# Patient Record
Sex: MALE | Race: OTHER | URBAN - METROPOLITAN AREA
[De-identification: names, ages, dates, MRNs, and addresses within clinical notes are randomized per-mention and may not be internally consistent; named-entity substitution may affect disease eponyms.]

---

## 2017-08-01 ENCOUNTER — OFFICE VISIT (OUTPATIENT)
Dept: FAMILY MEDICINE CLINIC | Age: 32
End: 2017-08-01

## 2017-08-01 ENCOUNTER — HOSPITAL ENCOUNTER (OUTPATIENT)
Dept: LAB | Age: 32
Discharge: HOME | End: 2017-08-01

## 2017-08-01 VITALS
SYSTOLIC BLOOD PRESSURE: 146 MMHG | DIASTOLIC BLOOD PRESSURE: 86 MMHG | TEMPERATURE: 99.1 F | WEIGHT: 203 LBS | BODY MASS INDEX: 31.86 KG/M2 | HEART RATE: 94 BPM | HEIGHT: 67 IN

## 2017-08-01 DIAGNOSIS — G89.29 CHRONIC MIDLINE LOW BACK PAIN WITHOUT SCIATICA: ICD-10-CM

## 2017-08-01 DIAGNOSIS — R35.0 URINE FREQUENCY: Primary | ICD-10-CM

## 2017-08-01 DIAGNOSIS — M54.50 CHRONIC MIDLINE LOW BACK PAIN WITHOUT SCIATICA: ICD-10-CM

## 2017-08-01 DIAGNOSIS — R35.0 URINE FREQUENCY: ICD-10-CM

## 2017-08-01 LAB
ALBUMIN SERPL BCP-MCNC: 4.8 G/DL (ref 3.5–5)
ALBUMIN/GLOB SERPL: 1.5 {RATIO} (ref 1.1–2.2)
ALP SERPL-CCNC: 93 U/L (ref 45–117)
ALT SERPL-CCNC: 49 U/L (ref 12–78)
ANION GAP BLD CALC-SCNC: 5 MMOL/L (ref 5–15)
AST SERPL W P-5'-P-CCNC: 27 U/L (ref 15–37)
BILIRUB SERPL-MCNC: 0.5 MG/DL (ref 0.2–1)
BILIRUB UR QL STRIP: NEGATIVE
BUN SERPL-MCNC: 13 MG/DL (ref 6–20)
BUN/CREAT SERPL: 11 (ref 12–20)
CALCIUM SERPL-MCNC: 8.9 MG/DL (ref 8.5–10.1)
CHLORIDE SERPL-SCNC: 104 MMOL/L (ref 97–108)
CO2 SERPL-SCNC: 29 MMOL/L (ref 21–32)
CREAT SERPL-MCNC: 1.19 MG/DL (ref 0.7–1.3)
GLOBULIN SER CALC-MCNC: 3.3 G/DL (ref 2–4)
GLUCOSE SERPL-MCNC: 119 MG/DL (ref 65–100)
GLUCOSE UR-MCNC: NEGATIVE MG/DL
KETONES P FAST UR STRIP-MCNC: NEGATIVE MG/DL
PH UR STRIP: 6 [PH] (ref 4.6–8)
POTASSIUM SERPL-SCNC: 3.6 MMOL/L (ref 3.5–5.1)
PROT SERPL-MCNC: 8.1 G/DL (ref 6.4–8.2)
PROT UR QL STRIP: NEGATIVE MG/DL
SODIUM SERPL-SCNC: 138 MMOL/L (ref 136–145)
SP GR UR STRIP: 1.01 (ref 1–1.03)
UA UROBILINOGEN AMB POC: NORMAL (ref 0.2–1)
URINALYSIS CLARITY POC: CLEAR
URINALYSIS COLOR POC: YELLOW
URINE BLOOD POC: NORMAL
URINE LEUKOCYTES POC: NEGATIVE
URINE NITRITES POC: NEGATIVE

## 2017-08-01 PROCEDURE — 80053 COMPREHEN METABOLIC PANEL: CPT | Performed by: NURSE PRACTITIONER

## 2017-08-01 PROCEDURE — 87491 CHLMYD TRACH DNA AMP PROBE: CPT | Performed by: NURSE PRACTITIONER

## 2017-08-01 PROCEDURE — 87086 URINE CULTURE/COLONY COUNT: CPT | Performed by: NURSE PRACTITIONER

## 2017-08-01 RX ORDER — NAPROXEN 500 MG/1
500 TABLET ORAL 2 TIMES DAILY WITH MEALS
Qty: 40 TAB | Refills: 1 | Status: SHIPPED | OUTPATIENT
Start: 2017-08-01

## 2017-08-01 NOTE — PROGRESS NOTES
Subjective:     Chief Complaint   Patient presents with   [address]        He  is a 33 y.o. male who presents for evaluation of LBP x 1-2 months ago. No Hx of fall/trauma to back. Pain is continous. Pt also notes some urinary frequency, it worse at night where he notes he has to go q15-30 min x 3 times then stops. Pt has also noted some itching s/p urination. No penile discharge. No change w/ sexual partners in last 1-2 years. PMH: Herpes labialis    Surg: wisdom tooth removal.     NKDA     No current Rx    Pt works as a  x 10 years. Pt denies etoh, tobacco nor drug use. Pt is single but with a long time partner. Family Hx: mother, colon CA. ROS  Gen - no fever/chills  Resp - no dyspnea or cough  CV - no chest pain or LINDA  Rest per HPI    No past medical history on file. No past surgical history on file. No current outpatient prescriptions on file prior to visit. No current facility-administered medications on file prior to visit. Objective:     Vitals:    08/01/17 1353   BP: 146/86   Pulse: 94   Temp: 99.1 °F (37.3 °C)   TempSrc: Oral   Weight: 203 lb (92.1 kg)   Height: 5' 7.44\" (1.713 m)       Physical Examination:  General appearance - alert, well appearing, and in no distress  Eyes -sclera anicteric  Neck - supple, no significant adenopathy, no thyromegaly  Chest - clear to auscultation, no wheezes, rales or rhonchi, symmetric air entry  Heart - normal rate, regular rhythm, normal S1, S2, no murmurs, rubs, clicks or gallops  Neurological - alert, oriented, no focal findings or movement disorder noted  Abdomen-BS present/WNL x 4 quads, non-tender/distended, soft,no organomegaly  Penis-no discharge/skin changes, uncircumcised, no scrotal edema nor testicular masses, no hernias noted.       Recent Results (from the past 12 hour(s))   AMB POC URINALYSIS DIP STICK MANUAL W/O MICRO    Collection Time: 08/01/17  2:19 PM   Result Value Ref Range    Color (UA POC) Yellow     Clarity (UA POC) Clear     Glucose (UA POC) Negative Negative    Bilirubin (UA POC) Negative Negative    Ketones (UA POC) Negative Negative    Specific gravity (UA POC) 1.010 1.001 - 1.035    Blood (UA POC) 3+ Negative    pH (UA POC) 6 4.6 - 8.0    Protein (UA POC) Negative Negative mg/dL    Urobilinogen (UA POC) 0.2 mg/dL 0.2 - 1    Nitrites (UA POC) Negative Negative    Leukocyte esterase (UA POC) Negative Negative         Assessment/ Plan:   Diagnoses and all orders for this visit:    1. Urine frequency  -     AMB POC URINALYSIS DIP STICK MANUAL W/O MICRO  -     CULTURE, URINE; Future  -     CHLAMYDIA/GC PCR; Future  -     METABOLIC PANEL, COMPREHENSIVE; Future    2. Chronic midline low back pain without sciatica  -     naproxen (NAPROSYN) 500 mg tablet; Take 1 Tab by mouth two (2) times daily (with meals). Noted hematuria on POC UA. LBP is midline and suspected M/S vs renal caluli or UTI. Send off urine Cx and g/c. Check renal function today. PRN Naproxen for back pain.  exam unremarkable. At end of visit, Pt also noted c/o of rectal inflammation and poor response from topical steroids. Recommend OTC prep H suppositories and would eval further at f/u appt. Change POC if labs abnormal.     I have discussed the diagnosis with the patient and the intended plan as seen in the above orders. The patient has received an after-visit summary and questions were answered concerning future plans. I have discussed medication side effects and warnings with the patient as well. The patient verbalizes understanding and agreement with the plan. Follow-up Disposition:  Return in about 6 weeks (around 9/12/2017).

## 2017-08-01 NOTE — PROGRESS NOTES
Results for orders placed or performed in visit on 08/01/17   AMB POC URINALYSIS DIP STICK MANUAL W/O MICRO   Result Value Ref Range    Color (UA POC) Yellow     Clarity (UA POC) Clear     Glucose (UA POC) Negative Negative    Bilirubin (UA POC) Negative Negative    Ketones (UA POC) Negative Negative    Specific gravity (UA POC) 1.010 1.001 - 1.035    Blood (UA POC) 3+ Negative    pH (UA POC) 6 4.6 - 8.0    Protein (UA POC) Negative Negative mg/dL    Urobilinogen (UA POC) 0.2 mg/dL 0.2 - 1    Nitrites (UA POC) Negative Negative    Leukocyte esterase (UA POC) Negative Negative

## 2017-08-01 NOTE — PROGRESS NOTES
Avs discussed with Rosalinda Ramos by Discharge Nurse Thiaog Puga LPN with Mckenna Patel. Dicussed medication prescribed today.    AVS printed and given to patient Thiago Puga LPN

## 2017-08-01 NOTE — PATIENT INSTRUCTIONS
Micción frecuente: Instrucciones de cuidado - [ Frequent Urination: Care Instructions ]  Instrucciones de cuidado  Un síntoma común de problemas urinarios, tales capo infecciones de las vías urinarias, es tener a menudo ganas de orinar molly por lo general eliminar poca orina. La vejiga puede inflamarse. [place] puede causar ganas de orinar. Es posible que usted trate de orinar con más frecuencia de lo habitual para tratar de calmar danny impulso. La micción frecuente (orinar con frecuencia) también puede ser causada por infecciones de transmisión sexual (STI, por олег siglas en inglés) o cálculos renales. O puede ocurrir cuando algo irrita el conducto que transporta la orina de la vejiga al exterior del cuerpo (uretra). También puede ser tim señal de diabetes. Puede ser difícil encontrar la causa. Es posible que usted deba hacerse pruebas. La atención de seguimiento es tim parte clave de leyva tratamiento y seguridad. Asegúrese de hacer y acudir a todas las citas, y llame a leyva médico si está teniendo problemas. También es tim buena idea saber los resultados de олег exámenes y mantener tim lista de los medicamentos que niko. ¿Cómo puede cuidarse en el hogar? · [employer] agua yin los siguientes bhavik o [address] [place] ayudará a que la orina sea menos concentrada. (Si tiene enfermedad de los riñones, el corazón o el hígado y tiene que [place] líquidos, hable con leyva médico antes de aumentar la cantidad de líquidos que gillian). · Evite las bebidas gaseosas o con cafeína. Pueden irritar la vejiga. Para las mujeres:  · Orine inmediatamente después de maranda tenido relaciones sexuales. · Después de ir al baño, límpiese de adelante hacia atrás. · Evite el uso de duchas vaginales, los charly de burbujas y los [address] de higiene femenina. Y evite otros productos para la higiene femenina que contengan desodorantes. ¿Cuándo debe pedir ayuda?   Llame a leyva médico ahora mismo o busque atención médica inmediata si:  · Tiene síntomas nuevos capo fiebre, náuseas o vómito. · Tiene síntomas nuevos o peores de un problema urinario. Por ejemplo:  ¨ Tiene west o pus en la orina. ¨ Tiene escalofríos o le duele el cuerpo. ¨ Siente dolor al [place]. ¨ Tiene dolor en la phil o el abdomen. ¨ Tiene dolor de espalda jason debajo de la caja torácica (el flanco). Vigile muy de cerca los cambios en leyva dixie, y asegúrese de comunicarse con leyva médico si siente más sed de lo habitual.  ¿Dónde puede encontrar más información en inglés? Damaris leal [email or web address] Jairo [facility] Q964 en la búsqueda para aprender más acerca de \"Micción frecuente: Instrucciones de cuidado - [ Frequent Urination: Care Instructions ]. \"  Revisado: 5 [place], 2017  Versión del contenido: 11.3  © 6231-8259 Healthwise, Incorporated. Las instrucciones de cuidado fueron adaptadas bajo licencia por [employer] (which disclaims liability or warranty for this information). Si usted tiene [place] afección médica o sobre estas instrucciones, siempre pregunte a leyva profesional de dixie. Healthwise, Incorporated niega toda garantía o responsabilidad por leyva uso de esta información.

## 2017-08-02 LAB
C TRACH DNA SPEC QL NAA+PROBE: NEGATIVE
N GONORRHOEA DNA SPEC QL NAA+PROBE: NEGATIVE
SAMPLE TYPE: NORMAL
SERVICE CMNT-IMP: NORMAL
SPECIMEN SOURCE: NORMAL

## 2017-08-03 LAB
BACTERIA SPEC CULT: NORMAL
CC UR VC: NORMAL
SERVICE CMNT-IMP: NORMAL

## 2017-09-06 ENCOUNTER — OFFICE VISIT (OUTPATIENT)
Dept: FAMILY MEDICINE CLINIC | Age: 32
End: 2017-09-06

## 2017-09-06 VITALS
BODY MASS INDEX: 31.14 KG/M2 | HEIGHT: 67 IN | SYSTOLIC BLOOD PRESSURE: 129 MMHG | TEMPERATURE: 98.1 F | WEIGHT: 198.4 LBS | HEART RATE: 78 BPM | DIASTOLIC BLOOD PRESSURE: 76 MMHG

## 2017-09-06 DIAGNOSIS — K64.0 FIRST DEGREE HEMORRHOIDS: Primary | ICD-10-CM

## 2017-09-06 RX ORDER — TRIAMCINOLONE ACETONIDE 1 MG/G
CREAM TOPICAL 2 TIMES DAILY
Qty: 80 G | Refills: 1 | Status: SHIPPED | OUTPATIENT
Start: 2017-09-06

## 2017-09-06 NOTE — PROGRESS NOTES
Subjective:     Chief Complaint   Patient presents with    Urinary Frequency     f/u        He  is a 33 y.o. male who presents for evaluation of LBP/hemorrhoids. Since LOV, Pt notes his pain and urinary frequency have resolved. No concerns r/t bowel function. Regular 1-2x day. Has tried to increase fiber. Reports he will sporadically have itching in his rectum, 2-3x month. Reports taking OTC supplement from his home country which have improved his S&S. No systemic symptoms nor crow bleeding from his rectum. ROS  Gen - no fever/chills  Resp - no dyspnea or cough  CV - no chest pain or LINDA  Rest per HPI    No past medical history on file. No past surgical history on file. Current Outpatient Prescriptions on File Prior to Visit   Medication Sig Dispense Refill    naproxen (NAPROSYN) 500 mg tablet Take 1 Tab by mouth two (2) times daily (with meals). 40 Tab 1     No current facility-administered medications on file prior to visit. Objective:     Vitals:    09/06/17 1329   BP: 129/76   Pulse: 78   Temp: 98.1 °F (36.7 °C)   TempSrc: Oral   Weight: 198 lb 6.4 oz (90 kg)   Height: 5' 7.44\" (1.713 m)       Physical Examination:  General appearance - alert, well appearing, and in no distress  Eyes -sclera anicteric  Neck - supple, no significant adenopathy, no thyromegaly  Chest - clear to auscultation, no wheezes, rales or rhonchi, symmetric air entry  Heart - normal rate, regular rhythm, normal S1, S2, no murmurs, rubs, clicks or gallops  Neurological - alert, oriented, no focal findings or movement disorder noted  Abdomen-BS present/WNL x 4 quads, non-tender/distended, soft,no organomegaly    Small, 1 degree hemrrhoid palpated between 7-8:00 position in mid rectum. Assessment/ Plan:   Diagnoses and all orders for this visit:    1. First degree hemorrhoids  -     triamcinolone acetonide (KENALOG) 0.1 % topical cream; Apply  to affected area two (2) times a day.  use thin layer      Counseled Pt on taking sitz baths, diet changes, PRN and attempting relief with topical steroid cream.     RTC PRN if no improvement after 4-6 weeks. I have discussed the diagnosis with the patient and the intended plan as seen in the above orders. The patient has received an after-visit summary and questions were answered concerning future plans. I have discussed medication side effects and warnings with the patient as well. The patient verbalizes understanding and agreement with the plan. Follow-up Disposition:  Return if symptoms worsen or fail to improve.

## 2017-09-06 NOTE — PROGRESS NOTES
Coordination of Care  1. Have you been to the ER, urgent care clinic since your last visit? Hospitalized since your last visit? No    2. Have you seen or consulted any other health care providers outside of the [address] [place] [address] since your last visit? Include any pap smears or colon screening. No    Medications  Medication Reconciliation Performed: no  Patient does not need refills     Learning Assessment Complete?  yes

## 2017-09-06 NOTE — PROGRESS NOTES
Patient seen for discharge and he declined the need for an . We reviewed the AVS, prescription and pharmacy location and the patient expressed understanding.  Martina Maldonado RN

## 2017-09-06 NOTE — PATIENT INSTRUCTIONS
Hemorroides: Instrucciones de cuidado - [ Hemorrhoids: Care Instructions ]  Instrucciones de [address] hemorroides son [place] agrandadas que se desarrollan en el canal del ano. Los síntomas más comunes son sangrado yin las evacuaciones del intestino, comezón, hinchazón y dolor rectal. A veces pueden ser incómodas, molly las hemorroides harman vez son un problema grave. [address] hemorroides se pueden tratar con cambios sencillos en leyva Lara Folk y олег hábitos intestinales. Entre estos cambios se encuentran consumir más [place] y no esforzarse (pujar) para eliminar las heces (defecar). [address] hemorroides no requieren de [place] u otro tratamiento a menos que sha muy grandes y dolorosas o sangren mucho. La atención de seguimiento es itm parte clave de leyva tratamiento y seguridad. Asegúrese de hacer y acudir a todas las citas, y llame a leyva médico si está teniendo problemas. También es tim buena idea saber los resultados de los exámenes y mantener tim lista de los medicamentos que niko. ¿Cómo puede cuidarse en el hogar? · Si[place]ntese en unas pocas pulgadas de agua tibia (baño de asiento) 3 veces al día y después de evacuar el intestino. El agua tibia ayuda con el dolor y la comezón. · Colóquese hielo en la alon anal varias veces al día yin 10 minutos cada vez. Póngase un paño gomez entre el hielo y la piel. Enseguida póngase tim toalla húmeda tibia en la alon yin otros 10 a 20 minutos. · [employer] analgésicos (medicamentos para el dolor) exactamente según las indicaciones. ¨ Si el médico le recetó un analgésico, tómelo según las indicaciones. ¨ Si no está tomando un analgésico recetado, pregúntele a leyva médico si puede todd bhavik de [employer]. · Mantenga limpia la alon del ano, molly límpiese con suavidad. Utilice agua y [address] [facility], [facility], o use toallitas para bebé o [address] medicinales, capo las de [place].   · Utilice ropa interior de algodón y ropa holgada para reducir la humedad en la alon del ano. · [employer]. Dorethea Tiago capo panes y cereales integrales, vegetales crudos, frutas crudas y secas, y frijoles (habichuelas). · Mayra abundantes líquidos, suficientes para que leyva orina sea de color amarillo rtavon o transparente capo el agua. Si tiene [employer], el corazón o el hígado y tiene que Oleg's líquidos, hable con leyva médico antes de aumentar leyva consumo. · Utilice un ablandador de heces que contenga salvado o psilio. Puede ahorrar dinero comprando salvado o psilio (disponible a granel en la mayoría de las tiendas naturistas) y [employer] comidas o mezclándolo con jugo de fruta. O puede utilizar productos capo Metamucil o Hydrocil. · Practique hábitos saludables de evacuación. ¨ Vaya al baño buck pronto capo tenga ganas. ¨ Evite esforzarse al evacuar. Relájese y dese tiempo para dejar que las cosas ocurran de forma natural.  ¨ No contenga la respiración mientras evacúa. ¨ No glen mientras está sentado en el inodoro. Levántese del inodoro buck pronto haya terminado. · [employer] medicamentos exactamente capo le fueron recetados. Llame a leyva médico si javan estar teniendo problemas con leyva medicamento. ¿Cuándo debe pedir ayuda? Llame al 911 en cualquier momento que considere que necesita atención de emergencia. Por ejemplo, llame si:  · Faye heces son de color rojizo o muy sanguinolentas (con west). Llame a leyva médico ahora mismo o busque atención médica inmediata si:  · Siente un dolor más intenso. · Tiene mayor sangrado. Preste especial atención a los cambios en leyva dixie y asegúrese de comunicarse con leyva médico si:  · Faye síntomas no pinon gale después de 3 ó 4 días. ¿Dónde puede encontrar más información en inglés? K[place] a [email or web address]   Jennifer CORDON48 en la búsqueda para aprender más acerca de \"Hemorroides: Instrucciones de cuidado - [ Hemorrhoids: Care Instructions ].\"  Revisado: 9 agosto, 2016  Versión del contenido: 11.3  © 5674-0329 Healthwise, [place]. Las instrucciones de cuidado fueron adaptadas bajo licencia por [employer] (which disclaims liability or warranty for this information). Si usted tiene [place] afección médica o sobre estas instrucciones, siempre pregunte a leyva profesional de dixie. Healthwise, Incorporated niega toda garantía o responsabilidad por leyva uso de esta información.